# Patient Record
Sex: MALE | Race: WHITE | URBAN - METROPOLITAN AREA
[De-identification: names, ages, dates, MRNs, and addresses within clinical notes are randomized per-mention and may not be internally consistent; named-entity substitution may affect disease eponyms.]

---

## 2017-02-02 ENCOUNTER — OP HISTORICAL/CONVERTED ENCOUNTER (OUTPATIENT)
Dept: OTHER | Age: 82
End: 2017-02-02

## 2017-02-11 ENCOUNTER — OP HISTORICAL/CONVERTED ENCOUNTER (OUTPATIENT)
Dept: OTHER | Age: 82
End: 2017-02-11

## 2017-03-01 ENCOUNTER — OP HISTORICAL/CONVERTED ENCOUNTER (OUTPATIENT)
Dept: OTHER | Age: 82
End: 2017-03-01

## 2017-03-15 ENCOUNTER — OP HISTORICAL/CONVERTED ENCOUNTER (OUTPATIENT)
Dept: OTHER | Age: 82
End: 2017-03-15

## 2017-03-17 ENCOUNTER — OP HISTORICAL/CONVERTED ENCOUNTER (OUTPATIENT)
Dept: OTHER | Age: 82
End: 2017-03-17

## 2017-04-07 ENCOUNTER — OP HISTORICAL/CONVERTED ENCOUNTER (OUTPATIENT)
Dept: OTHER | Age: 82
End: 2017-04-07

## 2017-04-25 ENCOUNTER — ED HISTORICAL/CONVERTED ENCOUNTER (OUTPATIENT)
Dept: OTHER | Age: 82
End: 2017-04-25

## 2017-05-10 ENCOUNTER — IP HISTORICAL/CONVERTED ENCOUNTER (OUTPATIENT)
Dept: OTHER | Age: 82
End: 2017-05-10

## 2017-05-10 ENCOUNTER — OP HISTORICAL/CONVERTED ENCOUNTER (OUTPATIENT)
Dept: OTHER | Age: 82
End: 2017-05-10

## 2017-06-01 ENCOUNTER — OP HISTORICAL/CONVERTED ENCOUNTER (OUTPATIENT)
Dept: OTHER | Age: 82
End: 2017-06-01

## 2017-07-03 ENCOUNTER — OP HISTORICAL/CONVERTED ENCOUNTER (OUTPATIENT)
Dept: OTHER | Age: 82
End: 2017-07-03

## 2018-08-02 ENCOUNTER — OP HISTORICAL/CONVERTED ENCOUNTER (OUTPATIENT)
Dept: OTHER | Age: 83
End: 2018-08-02

## 2021-08-04 ENCOUNTER — OFFICE VISIT (OUTPATIENT)
Dept: ENT CLINIC | Age: 86
End: 2021-08-04
Payer: MEDICARE

## 2021-08-04 VITALS
SYSTOLIC BLOOD PRESSURE: 120 MMHG | HEART RATE: 54 BPM | TEMPERATURE: 97 F | WEIGHT: 190 LBS | OXYGEN SATURATION: 99 % | HEIGHT: 66 IN | DIASTOLIC BLOOD PRESSURE: 70 MMHG | BODY MASS INDEX: 30.53 KG/M2 | RESPIRATION RATE: 16 BRPM

## 2021-08-04 DIAGNOSIS — H90.3 SENSORINEURAL HEARING LOSS (SNHL) OF BOTH EARS: ICD-10-CM

## 2021-08-04 DIAGNOSIS — R07.0 THROAT PAIN: Primary | ICD-10-CM

## 2021-08-04 DIAGNOSIS — R13.14 PHARYNGOESOPHAGEAL DYSPHAGIA: ICD-10-CM

## 2021-08-04 DIAGNOSIS — Z85.89 HISTORY OF MALIGNANT NEOPLASM OF HEAD AND NECK: ICD-10-CM

## 2021-08-04 DIAGNOSIS — R29.898 RIGHT ARM WEAKNESS: ICD-10-CM

## 2021-08-04 PROCEDURE — G8432 DEP SCR NOT DOC, RNG: HCPCS | Performed by: OTOLARYNGOLOGY

## 2021-08-04 PROCEDURE — G8427 DOCREV CUR MEDS BY ELIG CLIN: HCPCS | Performed by: OTOLARYNGOLOGY

## 2021-08-04 PROCEDURE — G8536 NO DOC ELDER MAL SCRN: HCPCS | Performed by: OTOLARYNGOLOGY

## 2021-08-04 PROCEDURE — 1101F PT FALLS ASSESS-DOCD LE1/YR: CPT | Performed by: OTOLARYNGOLOGY

## 2021-08-04 PROCEDURE — 31575 DIAGNOSTIC LARYNGOSCOPY: CPT | Performed by: OTOLARYNGOLOGY

## 2021-08-04 PROCEDURE — 99214 OFFICE O/P EST MOD 30 MIN: CPT | Performed by: OTOLARYNGOLOGY

## 2021-08-04 PROCEDURE — G8417 CALC BMI ABV UP PARAM F/U: HCPCS | Performed by: OTOLARYNGOLOGY

## 2021-08-04 RX ORDER — TERAZOSIN 10 MG/1
CAPSULE ORAL
COMMUNITY
Start: 2021-06-02

## 2021-08-04 RX ORDER — FLUOXETINE HYDROCHLORIDE 20 MG/1
CAPSULE ORAL
COMMUNITY
Start: 2021-06-10

## 2021-08-04 RX ORDER — FINASTERIDE 5 MG/1
TABLET, FILM COATED ORAL
COMMUNITY
Start: 2021-06-28

## 2021-08-04 RX ORDER — RIVAROXABAN 15 MG/1
TABLET, FILM COATED ORAL
COMMUNITY
Start: 2021-07-14

## 2021-08-04 RX ORDER — LEVOTHYROXINE SODIUM 50 UG/1
TABLET ORAL
COMMUNITY
Start: 2021-05-28

## 2021-08-04 NOTE — PROGRESS NOTES
Subjective:    Franko Current   81 y.o.   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 He reports having a sore throat was worse last week thought it might have been due to mowing grass outside. Also dry throat in the morning. Also occasionally feeling choked up on his liquids. Modifying Features -massage helps with the right neck pains    Associated symptoms/signs -hearing loss, right shoulder weakness      Review of Systems  Review of Systems   Constitutional: Negative for chills and fever. HENT: Positive for hearing loss and sore throat. Negative for ear pain, nosebleeds and tinnitus. Eyes: Negative for blurred vision and double vision. Respiratory: Negative for cough, sputum production and shortness of breath. Cardiovascular: Negative for chest pain and palpitations. Gastrointestinal: Negative for heartburn, nausea and vomiting. Genitourinary: Positive for dysuria. Musculoskeletal: Positive for joint pain, myalgias and neck pain. Skin: Negative. Neurological: Negative for dizziness, speech change, weakness and headaches. Endo/Heme/Allergies: Negative for environmental allergies. Does not bruise/bleed easily. Psychiatric/Behavioral: Negative for memory loss. The patient does not have insomnia. Past Medical History:   Diagnosis Date    Thyroid disease      Prior to Admission medications    Medication Sig Start Date End Date Taking?  Authorizing Provider   FLUoxetine (PROzac) 20 mg capsule TAKE ONE CAPSULE BY MOUTH EVERY DAY 6/10/21  Yes Provider, Historical   levothyroxine (SYNTHROID) 50 mcg tablet TAKE ONE TABLET BY MOUTH EVERY MORNING ON AN EMPTY STOMACH 5/28/21  Yes Provider, Historical   Xarelto 15 mg tab tablet TAKE ONE TABLET BY MOUTH EVERY DAY WITH evening meal 7/14/21  Yes Provider, Historical   terazosin (HYTRIN) 10 mg capsule TAKE ONE CAPSULE BY MOUTH EVERY DAY 6/2/21  Yes Provider, Historical   finasteride (PROSCAR) 5 mg tablet TAKE ONE TABLET BY MOUTH EVERY DAY 6/28/21  Yes Provider, Historical            Objective:     Visit Vitals  /70 (BP 1 Location: Left upper arm, BP Patient Position: Sitting, BP Cuff Size: Adult)   Pulse (!) 54   Temp 97 °F (36.1 °C) (Temporal)   Resp 16   Ht 5' 6\" (1.676 m)   Wt 190 lb (86.2 kg)   SpO2 99%   BMI 30.67 kg/m²        Physical Exam  Vitals reviewed. Constitutional:       General: He is awake. Appearance: Normal appearance. He is normal weight. HENT:      Head: Normocephalic and atraumatic. Jaw: There is normal jaw occlusion. No trismus, tenderness or malocclusion. Salivary Glands: Right salivary gland is not diffusely enlarged or tender. Left salivary gland is not diffusely enlarged or tender. Comments: Scattered crusted skin lesions throughout the face and head     Right Ear: Tympanic membrane, ear canal and external ear normal. Decreased hearing noted. Left Ear: Tympanic membrane, ear canal and external ear normal. Decreased hearing noted. Ears:      Carpenter exam findings: does not lateralize. Right Rinne: AC > BC. Left Rinne: AC > BC. Nose: No septal deviation, mucosal edema or rhinorrhea. Right Turbinates: Not enlarged, swollen or pale. Left Turbinates: Not enlarged, swollen or pale. Right Sinus: No maxillary sinus tenderness or frontal sinus tenderness. Left Sinus: No maxillary sinus tenderness or frontal sinus tenderness. Mouth/Throat:      Lips: Pink. Mouth: Mucous membranes are moist. No oral lesions. Dentition: Abnormal dentition (Edentulous). No gum lesions. Tongue: No lesions.       Palate: No mass and lesions. Pharynx: Oropharynx is clear. Uvula midline. Tonsils: No tonsillar exudate. 0 on the right. 0 on the left. Eyes:      General: Vision grossly intact. Extraocular Movements: Extraocular movements intact. Right eye: No nystagmus. Left eye: No nystagmus. Pupils: Pupils are equal, round, and reactive to light. Neck:      Thyroid: No thyroid mass, thyromegaly or thyroid tenderness. Trachea: Trachea and phonation normal. No tracheal tenderness. Comments: Postsurgical changes right neck. Fibrosis of the sternocleidomastoid muscle. No mass or adenopathy  Cardiovascular:      Rate and Rhythm: Normal rate and regular rhythm. Pulmonary:      Effort: Pulmonary effort is normal.      Breath sounds: Normal breath sounds. No stridor. No wheezing. Musculoskeletal:         General: Normal range of motion. Right shoulder: Decreased strength. Cervical back: Normal range of motion. No edema or erythema. Lymphadenopathy:      Cervical: No cervical adenopathy. Skin:     General: Skin is warm and dry. Neurological:      General: No focal deficit present. Mental Status: He is alert and oriented to person, place, and time. Mental status is at baseline. Cranial Nerves: Cranial nerves are intact. Coordination: Romberg sign negative. Gait: Gait is intact. Psychiatric:         Mood and Affect: Mood normal.         Behavior: Behavior normal. Behavior is cooperative. Procedure Note - Fiberoptic Laryngoscopy    Verbal consent is obtained. The nares are sprayed with topical lidocaine/oxymetazoline solution. After several minutes the fiberoptic scope is advanced into one or both nasal passages. Findings are as summarized.     Nose - normal turbinates, septum; mucosal edema  Nasopharynx - normal eustachian tubes, no mucosal lesions  Oropharynx - normal tonsils, tongue base and posterior wall  Hypopharynx - normal pyriform sinus and post-cricoid region  Larynx - normal epiglottis, arytenoids, false cords, and true cords  Subglottis - visualized upper trachea is normal      Assessment/Plan:     Encounter Diagnoses   Name Primary?  Throat pain Yes    Pharyngoesophageal dysphagia     Right arm weakness     History of malignant neoplasm of head and neck     Sensorineural hearing loss (SNHL) of both ears      Throat pain/dysphagia -scope exam is clear today. Likely irritation from his exposure to grass, he also has dry mouth in the morning from mouth breathing. He can continue with lozenges and salt water gargles. His dysphagia is likely age-related and he will continue with small bites of food and small sips of water. 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 We discussed restarting physical therapy but he wants to continue to just do exercises at home. I do suggest he follow back up with dermatology to address the many facial skin lesions. Recommend follow-up in 6 months      Orders Placed This Encounter    LARYNGOSCOPY,FLEX FIBER,DIAGNOSTIC    FLUoxetine (PROzac) 20 mg capsule    levothyroxine (SYNTHROID) 50 mcg tablet    Xarelto 15 mg tab tablet    terazosin (HYTRIN) 10 mg capsule    finasteride (PROSCAR) 5 mg tablet     Follow-up and Dispositions    · Return in about 6 months (around 2/4/2022). Connor Livingston MD, [address] [facility]  [address] [place] [address] [address]  [facility] 14. Orikathleen Schreiber 7860

## 2021-08-04 NOTE — LETTER
8/4/2021    Patient: Dio Holcomb   YOB: 1935   Date of Visit: 8/4/2021     Emelyn Palmer MD  [address] [address]  Via Fax: [phone]    Dear Emelyn Palmer MD,      Thank you for referring Mr. Emerson Bales to [facility] [address] for evaluation. My notes for this consultation are attached. If you have questions, please do not hesitate to call me. I look forward to following your patient along with you.       Sincerely,    Radha Oropeza MD

## 2022-02-02 ENCOUNTER — OFFICE VISIT (OUTPATIENT)
Dept: ENT CLINIC | Age: 87
End: 2022-02-02
Payer: MEDICARE

## 2022-02-02 VITALS
HEART RATE: 63 BPM | TEMPERATURE: 96.9 F | SYSTOLIC BLOOD PRESSURE: 118 MMHG | RESPIRATION RATE: 16 BRPM | DIASTOLIC BLOOD PRESSURE: 72 MMHG | HEIGHT: 66 IN | OXYGEN SATURATION: 100 % | WEIGHT: 194 LBS | BODY MASS INDEX: 31.18 KG/M2

## 2022-02-02 DIAGNOSIS — Z85.89 HISTORY OF MALIGNANT NEOPLASM OF HEAD AND NECK: ICD-10-CM

## 2022-02-02 DIAGNOSIS — R26.89 BALANCE DISORDER: ICD-10-CM

## 2022-02-02 DIAGNOSIS — R13.14 PHARYNGOESOPHAGEAL DYSPHAGIA: Primary | ICD-10-CM

## 2022-02-02 DIAGNOSIS — H90.3 SENSORINEURAL HEARING LOSS (SNHL) OF BOTH EARS: ICD-10-CM

## 2022-02-02 PROCEDURE — G8417 CALC BMI ABV UP PARAM F/U: HCPCS | Performed by: OTOLARYNGOLOGY

## 2022-02-02 PROCEDURE — 99214 OFFICE O/P EST MOD 30 MIN: CPT | Performed by: OTOLARYNGOLOGY

## 2022-02-02 PROCEDURE — 1101F PT FALLS ASSESS-DOCD LE1/YR: CPT | Performed by: OTOLARYNGOLOGY

## 2022-02-02 PROCEDURE — G8432 DEP SCR NOT DOC, RNG: HCPCS | Performed by: OTOLARYNGOLOGY

## 2022-02-02 PROCEDURE — G8427 DOCREV CUR MEDS BY ELIG CLIN: HCPCS | Performed by: OTOLARYNGOLOGY

## 2022-02-02 PROCEDURE — G8536 NO DOC ELDER MAL SCRN: HCPCS | Performed by: OTOLARYNGOLOGY

## 2022-02-02 NOTE — PROGRESS NOTES
Visit Vitals  /72 (BP 1 Location: Left upper arm, BP Patient Position: Sitting, BP Cuff Size: Large adult)   Pulse 63   Temp 96.9 °F (36.1 °C) (Temporal)   Resp 16   Ht 5' 6\" (1.676 m)   Wt 194 lb (88 kg)   SpO2 100%   BMI 31.31 kg/m²     Chief Complaint   Patient presents with    Follow-up     Recheck Pharyngoesphogeal Dysphasia

## 2022-02-02 NOTE — PROGRESS NOTES
Subjective:    Lev Branch   81 y.o.   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 He reports having a sore throat was worse last week thought it might have been due to mowing grass outside. Also dry throat in the morning. Also occasionally feeling choked up on his liquids. Modifying Features -massage helps with the right neck pains    Associated symptoms/signs -hearing loss, right shoulder weakness    2/2/22 - 6 mos f/u overall doing well no changes in last 6 mos - he has seen Dr Rafi Mitchell for some skin lesions but he does not want to go to [place] for Mohs anymore    Review of Systems  Review of Systems   Constitutional: Negative for chills and fever. HENT: Positive for hearing loss and sore throat. Negative for ear pain, nosebleeds and tinnitus. Eyes: Negative for blurred vision and double vision. Respiratory: Negative for cough, sputum production and shortness of breath. Cardiovascular: Negative for chest pain and palpitations. Gastrointestinal: Negative for heartburn, nausea and vomiting. Genitourinary: Positive for dysuria. Musculoskeletal: Positive for joint pain, myalgias and neck pain. Skin: Negative. Neurological: Negative for dizziness, speech change, weakness and headaches. Endo/Heme/Allergies: Negative for environmental allergies. Does not bruise/bleed easily. Psychiatric/Behavioral: Negative for memory loss. The patient does not have insomnia. Past Medical History:   Diagnosis Date    Thyroid disease      Prior to Admission medications    Medication Sig Start Date End Date Taking?  Authorizing Provider   FLUoxetine (PROzac) 20 mg capsule TAKE ONE CAPSULE BY MOUTH EVERY DAY 6/10/21  Yes Provider, Historical   levothyroxine (SYNTHROID) 50 mcg tablet TAKE ONE TABLET BY MOUTH EVERY MORNING ON AN EMPTY STOMACH 5/28/21  Yes Provider, Historical   Xarelto 15 mg tab tablet TAKE ONE TABLET BY MOUTH EVERY DAY WITH evening meal 7/14/21  Yes Provider, Historical   terazosin (HYTRIN) 10 mg capsule TAKE ONE CAPSULE BY MOUTH EVERY DAY 6/2/21  Yes Provider, Historical   finasteride (PROSCAR) 5 mg tablet TAKE ONE TABLET BY MOUTH EVERY DAY 6/28/21  Yes Provider, Historical            Objective:     Visit Vitals  /72 (BP 1 Location: Left upper arm, BP Patient Position: Sitting, BP Cuff Size: Large adult)   Pulse 63   Temp 96.9 °F (36.1 °C) (Temporal)   Resp 16   Ht 5' 6\" (1.676 m)   Wt 194 lb (88 kg)   SpO2 100%   BMI 31.31 kg/m²        Physical Exam  Vitals reviewed. Constitutional:       General: He is awake. Appearance: Normal appearance. He is normal weight. HENT:      Head: Normocephalic and atraumatic. Jaw: There is normal jaw occlusion. No trismus, tenderness or malocclusion. Salivary Glands: Right salivary gland is not diffusely enlarged or tender. Left salivary gland is not diffusely enlarged or tender. Comments: Scattered crusted skin lesions throughout the face and head     Right Ear: Tympanic membrane, ear canal and external ear normal. Decreased hearing noted. Left Ear: Tympanic membrane, ear canal and external ear normal. Decreased hearing noted. Ears:      Carpenter exam findings: does not lateralize. Right Rinne: AC > BC. Left Rinne: AC > BC. Nose: No septal deviation, mucosal edema or rhinorrhea. Right Turbinates: Not enlarged, swollen or pale. Left Turbinates: Not enlarged, swollen or pale. Right Sinus: No maxillary sinus tenderness or frontal sinus tenderness. Left Sinus: No maxillary sinus tenderness or frontal sinus tenderness. Mouth/Throat:      Lips: Pink. Mouth: Mucous membranes are moist. No oral lesions. Dentition: Abnormal dentition (Edentulous). No gum lesions. Tongue: No lesions. Palate: No mass and lesions. Pharynx: Oropharynx is clear. Uvula midline. Tonsils: No tonsillar exudate. 0 on the right. 0 on the left. Eyes:      General: Vision grossly intact. Extraocular Movements: Extraocular movements intact. Right eye: No nystagmus. Left eye: No nystagmus. Pupils: Pupils are equal, round, and reactive to light. Neck:      Thyroid: No thyroid mass, thyromegaly or thyroid tenderness. Trachea: Trachea and phonation normal. No tracheal tenderness. Comments: Postsurgical changes right neck. Fibrosis of the sternocleidomastoid muscle. No mass or adenopathy  Cardiovascular:      Rate and Rhythm: Normal rate and regular rhythm. Pulmonary:      Effort: Pulmonary effort is normal.      Breath sounds: Normal breath sounds. No stridor. No wheezing. Musculoskeletal:         General: Normal range of motion. Right shoulder: Decreased strength. Cervical back: Normal range of motion. No edema or erythema. Lymphadenopathy:      Cervical: No cervical adenopathy. Skin:     General: Skin is warm and dry. Neurological:      General: No focal deficit present. Mental Status: He is alert and oriented to person, place, and time. Mental status is at baseline. Cranial Nerves: Cranial nerves are intact. Coordination: Romberg sign negative. Gait: Gait is intact. Psychiatric:         Mood and Affect: Mood normal.         Behavior: Behavior normal. Behavior is cooperative. Assessment/Plan:     Encounter Diagnoses   Name Primary?  Pharyngoesophageal dysphagia Yes    History of malignant neoplasm of head and neck     Sensorineural hearing loss (SNHL) of both ears     Balance disorder      Throat pain/dysphagia - stable.  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 We discussed restarting physical therapy but he wants to continue to just do exercises at home. I do suggest he follow back up with dermatology to address the many facial skin lesions. I can excise any skin CA if necessary    Recommend follow-up in 6 months      No orders of the defined types were placed in this encounter. Connor Simmons MD, [address] [facility]  [address] [place] [address] [address]  [facility] 14. [phone]

## 2022-03-18 PROBLEM — H90.3 SENSORINEURAL HEARING LOSS (SNHL) OF BOTH EARS: Status: ACTIVE | Noted: 2021-08-04

## 2022-03-18 PROBLEM — R29.898 RIGHT ARM WEAKNESS: Status: ACTIVE | Noted: 2021-08-04

## 2022-03-19 PROBLEM — R07.0 THROAT PAIN: Status: ACTIVE | Noted: 2021-08-04

## 2022-03-19 PROBLEM — Z85.89 HISTORY OF MALIGNANT NEOPLASM OF HEAD AND NECK: Status: ACTIVE | Noted: 2021-08-04

## 2022-03-20 PROBLEM — R13.14 PHARYNGOESOPHAGEAL DYSPHAGIA: Status: ACTIVE | Noted: 2021-08-04

## 2022-08-02 ENCOUNTER — TELEPHONE (OUTPATIENT)
Dept: ENT CLINIC | Age: 87
End: 2022-08-02

## 2022-08-02 NOTE — TELEPHONE ENCOUNTER
Tried calling Kathy Raygoza  to confirm next appointment, Left voicemail for patient to return call.

## 2022-08-03 ENCOUNTER — OFFICE VISIT (OUTPATIENT)
Dept: ENT CLINIC | Age: 87
End: 2022-08-03
Payer: MEDICARE

## 2022-08-03 VITALS
DIASTOLIC BLOOD PRESSURE: 68 MMHG | BODY MASS INDEX: 31.18 KG/M2 | HEIGHT: 66 IN | WEIGHT: 194 LBS | OXYGEN SATURATION: 100 % | RESPIRATION RATE: 16 BRPM | HEART RATE: 64 BPM | SYSTOLIC BLOOD PRESSURE: 120 MMHG

## 2022-08-03 DIAGNOSIS — C44.300 MALIGNANT NEOPLASM SKIN OF FACE: ICD-10-CM

## 2022-08-03 DIAGNOSIS — H90.3 SENSORINEURAL HEARING LOSS (SNHL) OF BOTH EARS: ICD-10-CM

## 2022-08-03 DIAGNOSIS — C44.202 MALIGNANT NEOPLASM OF SKIN OF RIGHT EAR: ICD-10-CM

## 2022-08-03 DIAGNOSIS — Z85.89 HISTORY OF MALIGNANT NEOPLASM OF HEAD AND NECK: Primary | ICD-10-CM

## 2022-08-03 PROCEDURE — 1101F PT FALLS ASSESS-DOCD LE1/YR: CPT | Performed by: OTOLARYNGOLOGY

## 2022-08-03 PROCEDURE — G8417 CALC BMI ABV UP PARAM F/U: HCPCS | Performed by: OTOLARYNGOLOGY

## 2022-08-03 PROCEDURE — G8427 DOCREV CUR MEDS BY ELIG CLIN: HCPCS | Performed by: OTOLARYNGOLOGY

## 2022-08-03 PROCEDURE — G8536 NO DOC ELDER MAL SCRN: HCPCS | Performed by: OTOLARYNGOLOGY

## 2022-08-03 PROCEDURE — G8510 SCR DEP NEG, NO PLAN REQD: HCPCS | Performed by: OTOLARYNGOLOGY

## 2022-08-03 PROCEDURE — 99214 OFFICE O/P EST MOD 30 MIN: CPT | Performed by: OTOLARYNGOLOGY

## 2022-08-03 PROCEDURE — 1123F ACP DISCUSS/DSCN MKR DOCD: CPT | Performed by: OTOLARYNGOLOGY

## 2022-08-03 NOTE — PROGRESS NOTES
Subjective:    Katheryn Pickens   81 y.o.   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 He reports having a sore throat was worse last week thought it might have been due to mowing grass outside. Also dry throat in the morning. Also occasionally feeling choked up on his liquids. Modifying Features -massage helps with the right neck pains    Associated symptoms/signs -hearing loss, right shoulder weakness    2/2/22 - 6 mos f/u overall doing well no changes in last 6 mos - he has seen Dr Zoraida Linn for some skin lesions but he does not want to go to [place] for Mohs anymore    8/3/22 - 6 mo fu - pt had transferred care to [facility], [place], operated by [facility] dermatology - he has had some new lesions on the face that needed to be addressed    Review of Systems  Review of Systems   Constitutional:  Negative for chills and fever. HENT:  Positive for hearing loss and sore throat. Negative for ear pain, nosebleeds and tinnitus. Eyes:  Negative for blurred vision and double vision. Respiratory:  Negative for cough, sputum production and shortness of breath. Cardiovascular:  Negative for chest pain and palpitations. Gastrointestinal:  Negative for heartburn, nausea and vomiting. Genitourinary:  Positive for dysuria. Musculoskeletal:  Positive for joint pain, myalgias and neck pain. Skin: Negative. Neurological:  Negative for dizziness, speech change, weakness and headaches. Endo/Heme/Allergies:  Negative for environmental allergies. Does not bruise/bleed easily. Psychiatric/Behavioral:  Negative for memory loss. The patient does not have insomnia.         Past Medical History:   Diagnosis Date Thyroid disease      Prior to Admission medications    Medication Sig Start Date End Date Taking? Authorizing Provider   FLUoxetine (PROzac) 20 mg capsule TAKE ONE CAPSULE BY MOUTH EVERY DAY 6/10/21  Yes Provider, Historical   levothyroxine (SYNTHROID) 50 mcg tablet TAKE ONE TABLET BY MOUTH EVERY MORNING ON AN EMPTY STOMACH 5/28/21  Yes Provider, Historical   Xarelto 15 mg tab tablet TAKE ONE TABLET BY MOUTH EVERY DAY WITH evening meal 7/14/21  Yes Provider, Historical   terazosin (HYTRIN) 10 mg capsule TAKE ONE CAPSULE BY MOUTH EVERY DAY 6/2/21  Yes Provider, Historical   finasteride (PROSCAR) 5 mg tablet TAKE ONE TABLET BY MOUTH EVERY DAY 6/28/21  Yes Provider, Historical            Objective:     Visit Vitals  /68 (BP 1 Location: Left upper arm, BP Patient Position: Sitting, BP Cuff Size: Large adult)   Pulse 64   Resp 16   Ht 5' 6\" (1.676 m)   Wt 194 lb (88 kg)   SpO2 100%   BMI 31.31 kg/m²        Physical Exam  Vitals reviewed. Constitutional:       General: He is awake. Appearance: Normal appearance. He is normal weight. HENT:      Head: Normocephalic and atraumatic. Jaw: There is normal jaw occlusion. No trismus, tenderness or malocclusion. Salivary Glands: Right salivary gland is not diffusely enlarged or tender. Left salivary gland is not diffusely enlarged or tender. Comments: Scattered crusted skin lesions throughout the face and head     Right Ear: Tympanic membrane, ear canal and external ear normal. Decreased hearing noted. Left Ear: Tympanic membrane, ear canal and external ear normal. Decreased hearing noted. Nose: No septal deviation, mucosal edema or rhinorrhea. Right Turbinates: Not enlarged, swollen or pale. Left Turbinates: Not enlarged, swollen or pale. Right Sinus: No maxillary sinus tenderness or frontal sinus tenderness. Left Sinus: No maxillary sinus tenderness or frontal sinus tenderness. Mouth/Throat:      Lips: Pink. Mouth: Mucous membranes are moist. No oral lesions. Dentition: Abnormal dentition (Edentulous). No gum lesions. Tongue: No lesions. Palate: No mass and lesions. Pharynx: Oropharynx is clear. Uvula midline. Tonsils: No tonsillar exudate. 0 on the right. 0 on the left. Eyes:      General: Vision grossly intact. Extraocular Movements: Extraocular movements intact. Right eye: No nystagmus. Left eye: No nystagmus. Pupils: Pupils are equal, round, and reactive to light. Neck:      Thyroid: No thyroid mass, thyromegaly or thyroid tenderness. Trachea: Trachea and phonation normal. No tracheal tenderness. Comments: Postsurgical changes right neck. Fibrosis of the sternocleidomastoid muscle. No mass or adenopathy  Cardiovascular:      Rate and Rhythm: Normal rate and regular rhythm. Pulmonary:      Effort: Pulmonary effort is normal.      Breath sounds: Normal breath sounds. No stridor. No wheezing. Musculoskeletal:         General: Normal range of motion. Right shoulder: Decreased strength. Cervical back: Normal range of motion. No edema or erythema. Lymphadenopathy:      Cervical: No cervical adenopathy. Skin:     General: Skin is warm and dry. Neurological:      General: No focal deficit present. Mental Status: He is alert and oriented to person, place, and time. Mental status is at baseline. Cranial Nerves: Cranial nerves are intact. Coordination: Romberg sign negative. Gait: Gait is intact. Psychiatric:         Mood and Affect: Mood normal.         Behavior: Behavior normal. Behavior is cooperative. Assessment/Plan:     Encounter Diagnoses   Name Primary? 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 We discussed restarting physical therapy but he wants to continue to just do exercises at home. 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 This should be excised. Will arrange for office visit for this. We can do Oct 4th. No orders of the defined types were placed in this encounter. Connor Figueroa MD, [address] [facility]  [address] [place] [address] [address]  0 [facility] 11. [phone]

## 2022-08-03 NOTE — LETTER
8/3/2022    Patient: Kathy Raygoza   YOB: 1935   Date of Visit: 8/3/2022     Marlena Severs, MD  [address] [address]  Via Fax: [phone]    Dear Marlena Severs, MD,      Thank you for referring Mr. Hortencia Paz to [facility] [address] for evaluation. My notes for this consultation are attached. If you have questions, please do not hesitate to call me. I look forward to following your patient along with you.       Sincerely,    Kirstin Higuera MD

## 2022-08-03 NOTE — PROGRESS NOTES
Visit Vitals  /68 (BP 1 Location: Left upper arm, BP Patient Position: Sitting, BP Cuff Size: Large adult)   Pulse 64   Resp 16   Ht 5' 6\" (1.676 m)   Wt 194 lb (88 kg)   SpO2 100%   BMI 31.31 kg/m²     Chief Complaint   Patient presents with    Follow-up     Dysphagia

## 2022-10-04 ENCOUNTER — OFFICE VISIT (OUTPATIENT)
Dept: ENT CLINIC | Age: 87
End: 2022-10-04
Payer: MEDICARE

## 2022-10-04 ENCOUNTER — HOSPITAL ENCOUNTER (OUTPATIENT)
Dept: LAB | Age: 87
Discharge: HOME | End: 2022-10-04
Payer: MEDICARE

## 2022-10-04 VITALS
SYSTOLIC BLOOD PRESSURE: 120 MMHG | BODY MASS INDEX: 31.18 KG/M2 | HEIGHT: 66 IN | HEART RATE: 60 BPM | WEIGHT: 194 LBS | OXYGEN SATURATION: 99 % | RESPIRATION RATE: 18 BRPM | DIASTOLIC BLOOD PRESSURE: 70 MMHG

## 2022-10-04 DIAGNOSIS — C44.202: ICD-10-CM

## 2022-10-04 DIAGNOSIS — C44.300 MALIGNANT NEOPLASM SKIN OF FACE: Primary | ICD-10-CM

## 2022-10-04 PROCEDURE — 12051 INTMD RPR FACE/MM 2.5 CM/<: CPT | Performed by: OTOLARYNGOLOGY

## 2022-10-04 PROCEDURE — 88305 TISSUE EXAM BY PATHOLOGIST: CPT

## 2022-10-04 PROCEDURE — 11642 EXC F/E/E/N/L MAL+MRG 1.1-2: CPT | Performed by: OTOLARYNGOLOGY

## 2022-10-04 PROCEDURE — 88331 PATH CONSLTJ SURG 1 BLK 1SPC: CPT

## 2022-10-04 NOTE — PROGRESS NOTES
Excision Malignant Neoplasm of Skin with Intermediate Closure  Excision malignant neoplasm left preauricular cheek  Excision malignant neoplasm right superior auricular helix    Informed consent was obtained. The area surrounding the 2 skin lesions was cleansed with alcohol then injected with  4mL of 1% lidocaine with 1:100,000 parts epinephrine. We then prepped the areas with betadine and draped in sterile fashion. A 15 blade was used to incise the skin surrounding the lesions with gross margins. Nodular lesion of the left preauricular skin was taken down to healthy-appearing subcutaneous fat. The helix lesion was taken down to the perichondrium. . Scalpel and curved scissors were used to complete the excisions. The specimen are suture marked for pathologic orientation and sent for frozen section. Hemostasis was achieved with bipolar cautery and a temporary pressure dressing was applied. Frozen section revealed negative margins in the left cheek. Focally positive deep margin for the right helix therefore I excised the entire base of the helix resection which involved full-thickness resection of the helical cartilage. The specimen is sent for permanent section. The excision/defect is 1.3 cm for the ear and 1.5 cm for the left preauricular. We closed the left preauricular defect primarily with a layered closure. The surrounding skin was undermined with scissors, then the wound closed in layers with interrupted 4-0 vicryl deep suture, and a running 5-0 fast absorbing plain gut for the skin. The wound was cleaned and Steri-strips and sterile pressure dressing was applied. The helix excision was closed in a single layer with running 5-0 plain gut suture. Sterile dressing is applied.

## 2023-02-07 ENCOUNTER — TELEPHONE (OUTPATIENT)
Dept: ENT CLINIC | Age: 88
End: 2023-02-07

## 2023-02-08 ENCOUNTER — OFFICE VISIT (OUTPATIENT)
Dept: ENT CLINIC | Age: 88
End: 2023-02-08
Payer: MEDICARE

## 2023-02-08 VITALS
HEART RATE: 60 BPM | BODY MASS INDEX: 31.64 KG/M2 | DIASTOLIC BLOOD PRESSURE: 70 MMHG | WEIGHT: 196 LBS | OXYGEN SATURATION: 98 % | SYSTOLIC BLOOD PRESSURE: 124 MMHG

## 2023-02-08 DIAGNOSIS — C44.300 MALIGNANT NEOPLASM SKIN OF FACE: Primary | ICD-10-CM

## 2023-02-08 DIAGNOSIS — H90.3 SENSORINEURAL HEARING LOSS (SNHL) OF BOTH EARS: ICD-10-CM

## 2023-02-08 DIAGNOSIS — J01.00 ACUTE NON-RECURRENT MAXILLARY SINUSITIS: ICD-10-CM

## 2023-02-08 DIAGNOSIS — T66.XXXS RADIATION INJURY, SEQUELA: ICD-10-CM

## 2023-02-08 DIAGNOSIS — Z85.89 HISTORY OF MALIGNANT NEOPLASM OF HEAD AND NECK: ICD-10-CM

## 2023-02-08 PROCEDURE — 1101F PT FALLS ASSESS-DOCD LE1/YR: CPT | Performed by: OTOLARYNGOLOGY

## 2023-02-08 PROCEDURE — G8417 CALC BMI ABV UP PARAM F/U: HCPCS | Performed by: OTOLARYNGOLOGY

## 2023-02-08 PROCEDURE — G8427 DOCREV CUR MEDS BY ELIG CLIN: HCPCS | Performed by: OTOLARYNGOLOGY

## 2023-02-08 PROCEDURE — G8432 DEP SCR NOT DOC, RNG: HCPCS | Performed by: OTOLARYNGOLOGY

## 2023-02-08 PROCEDURE — G8536 NO DOC ELDER MAL SCRN: HCPCS | Performed by: OTOLARYNGOLOGY

## 2023-02-08 PROCEDURE — 99214 OFFICE O/P EST MOD 30 MIN: CPT | Performed by: OTOLARYNGOLOGY

## 2023-02-08 PROCEDURE — 1123F ACP DISCUSS/DSCN MKR DOCD: CPT | Performed by: OTOLARYNGOLOGY

## 2023-02-08 RX ORDER — AMOXICILLIN 500 MG/1
500 CAPSULE ORAL 3 TIMES DAILY
Qty: 30 CAPSULE | Refills: 1 | Status: SHIPPED | OUTPATIENT
Start: 2023-02-08

## 2023-02-08 NOTE — LETTER
2/8/2023    Patient: Cory Patel   YOB: 1935   Date of Visit: 2/8/2023     Robert Alvarado MD  Via [address] [address]  Via Fax: [phone]    Dear Robert Alvarado MD,      Thank you for referring Mr. Adonis Raman to [facility] [address] for evaluation. My notes for this consultation are attached. If you have questions, please do not hesitate to call me. I look forward to following your patient along with you.       Sincerely,    Adolph Freed MD

## 2023-02-08 NOTE — PROGRESS NOTES
Subjective:    Mohinder Godfrey   81 y.o.   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 He reports having a sore throat was worse last week thought it might have been due to mowing grass outside. Also dry throat in the morning. Also occasionally feeling choked up on his liquids. Modifying Features -massage helps with the right neck pains    Associated symptoms/signs -hearing loss, right shoulder weakness    2/2/22 - 6 mos f/u overall doing well no changes in last 6 mos - he has seen Dr Bo Jurado for some skin lesions but he does not want to go to [place] for Mohs anymore    8/3/22 - 6 mo fu - pt had transferred care to [facility] dermatology - he has had some new lesions on the face that needed to be addressed    2/8/23  6 mos fu pt has been doing well; some nasal congestion/head cold past 3 weeks, mucus is thicker and he needs vicks to breathe; no new skin lesions he is concerned about; had last excisions by me in Oct 2022    Review of Systems  Review of Systems   Constitutional:  Negative for chills and fever. HENT:  Positive for hearing loss and sore throat. Negative for ear pain, nosebleeds and tinnitus. Eyes:  Negative for blurred vision and double vision. Respiratory:  Negative for cough, sputum production and shortness of breath. Cardiovascular:  Negative for chest pain and palpitations. Gastrointestinal:  Negative for heartburn, nausea and vomiting. Genitourinary:  Positive for dysuria. Musculoskeletal:  Positive for joint pain, myalgias and neck pain. Skin: Negative. Neurological:  Negative for dizziness, speech change, weakness and headaches. Endo/Heme/Allergies:  Negative for environmental allergies. Does not bruise/bleed easily. Psychiatric/Behavioral:  Negative for memory loss. The patient does not have insomnia. Past Medical History:   Diagnosis Date    Thyroid disease      Prior to Admission medications    Medication Sig Start Date End Date Taking? Authorizing Provider   amoxicillin (AMOXIL) 500 mg capsule Take 1 Capsule by mouth three (3) times daily. 2/8/23  Yes James Alberto MD   FLUoxetine (PROzac) 20 mg capsule TAKE ONE CAPSULE BY MOUTH EVERY DAY 6/10/21  Yes Provider, Historical   levothyroxine (SYNTHROID) 50 mcg tablet TAKE ONE TABLET BY MOUTH EVERY MORNING ON AN EMPTY STOMACH 5/28/21  Yes Provider, Historical   Xarelto 15 mg tab tablet TAKE ONE TABLET BY MOUTH EVERY DAY WITH evening meal 7/14/21  Yes Provider, Historical   terazosin (HYTRIN) 10 mg capsule TAKE ONE CAPSULE BY MOUTH EVERY DAY 6/2/21  Yes Provider, Historical   finasteride (PROSCAR) 5 mg tablet TAKE ONE TABLET BY MOUTH EVERY DAY 6/28/21  Yes Provider, Historical            Objective:     Visit Vitals  /70 (BP 1 Location: Left arm, BP Patient Position: Sitting, BP Cuff Size: Large adult)   Pulse 60   Wt 196 lb (88.9 kg)   SpO2 98%   BMI 31.64 kg/m²        Physical Exam  Vitals reviewed. Constitutional:       General: He is awake. Appearance: Normal appearance. He is normal weight. HENT:      Head: Normocephalic and atraumatic. Jaw: There is normal jaw occlusion. No trismus, tenderness or malocclusion. Salivary Glands: Right salivary gland is not diffusely enlarged or tender. Left salivary gland is not diffusely enlarged or tender. Comments: Scattered skin changes on face/head, no major lesions     Right Ear: Tympanic membrane, ear canal and external ear normal. Decreased hearing noted. Left Ear: Tympanic membrane, ear canal and external ear normal. Decreased hearing noted. Nose: No septal deviation, mucosal edema or rhinorrhea. Right Turbinates: Not enlarged, swollen or pale. Left Turbinates: Not enlarged, swollen or pale. Right Sinus: No maxillary sinus tenderness or frontal sinus tenderness. Left Sinus: No maxillary sinus tenderness or frontal sinus tenderness. Mouth/Throat:      Lips: Pink. Mouth: Mucous membranes are moist. No oral lesions. Dentition: Abnormal dentition (Edentulous). No gum lesions. Tongue: No lesions. Palate: No mass and lesions. Pharynx: Oropharynx is clear. Uvula midline. Tonsils: No tonsillar exudate. 0 on the right. 0 on the left. Eyes:      General: Vision grossly intact. Extraocular Movements: Extraocular movements intact. Right eye: No nystagmus. Left eye: No nystagmus. Pupils: Pupils are equal, round, and reactive to light. Neck:      Thyroid: No thyroid mass, thyromegaly or thyroid tenderness. Trachea: Trachea and phonation normal. No tracheal tenderness. Comments: Postsurgical changes right neck. Fibrosis of the sternocleidomastoid muscle. No mass or adenopathy  Cardiovascular:      Rate and Rhythm: Normal rate and regular rhythm. Pulmonary:      Effort: Pulmonary effort is normal.      Breath sounds: Normal breath sounds. No stridor. No wheezing. Musculoskeletal:         General: Normal range of motion. Right shoulder: Decreased strength. Cervical back: Normal range of motion. No edema or erythema. Lymphadenopathy:      Cervical: No cervical adenopathy. Skin:     General: Skin is warm and dry. Neurological:      General: No focal deficit present. Mental Status: He is alert and oriented to person, place, and time. Mental status is at baseline. Coordination: Romberg sign negative. Gait: Gait is intact. Psychiatric:         Mood and Affect: Mood normal.         Behavior: Behavior normal. Behavior is cooperative. Assessment/Plan:     Encounter Diagnoses   Name Primary? Malignant neoplasm skin of face Yes    History of malignant neoplasm of head and neck     Sensorineural hearing loss (SNHL) of both ears     Acute non-recurrent maxillary sinusitis     Radiation injury, sequela        Throat pain/dysphagia - stable. 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 Recent excisions left preauricular and right helix are with free margins. Doing well    Will add abx for sinus      Orders Placed This Encounter    amoxicillin (AMOXIL) 500 mg capsule       Follow-up and Dispositions    Return in about 6 months (around 8/8/2023). Connor Mcgregor MD, [address]  [address] [place] [address] [address]  [facility] 14. [phone]

## 2023-05-16 RX ORDER — FINASTERIDE 5 MG/1
1 TABLET, FILM COATED ORAL DAILY
COMMUNITY
Start: 2021-06-28

## 2023-05-16 RX ORDER — AMOXICILLIN 500 MG/1
500 CAPSULE ORAL 3 TIMES DAILY
COMMUNITY
Start: 2023-02-08

## 2023-05-20 RX ORDER — LEVOTHYROXINE SODIUM 0.05 MG/1
TABLET ORAL
COMMUNITY
Start: 2021-05-28

## 2023-05-20 RX ORDER — FLUOXETINE HYDROCHLORIDE 20 MG/1
1 CAPSULE ORAL DAILY
COMMUNITY
Start: 2021-06-10

## 2023-05-20 RX ORDER — TERAZOSIN 10 MG/1
1 CAPSULE ORAL DAILY
COMMUNITY
Start: 2021-06-02

## 2023-08-09 ENCOUNTER — OFFICE VISIT (OUTPATIENT)
Age: 88
End: 2023-08-09
Payer: MEDICARE

## 2023-08-09 VITALS
WEIGHT: 196 LBS | OXYGEN SATURATION: 95 % | HEIGHT: 66 IN | RESPIRATION RATE: 17 BRPM | HEART RATE: 58 BPM | BODY MASS INDEX: 31.5 KG/M2 | DIASTOLIC BLOOD PRESSURE: 80 MMHG | SYSTOLIC BLOOD PRESSURE: 130 MMHG

## 2023-08-09 DIAGNOSIS — R07.0 THROAT PAIN: ICD-10-CM

## 2023-08-09 DIAGNOSIS — H90.3 SENSORINEURAL HEARING LOSS, BILATERAL: Primary | ICD-10-CM

## 2023-08-09 DIAGNOSIS — Z85.818 HISTORY OF MALIGNANT NEOPLASM OF PAROTID GLAND: ICD-10-CM

## 2023-08-09 PROCEDURE — G8427 DOCREV CUR MEDS BY ELIG CLIN: HCPCS | Performed by: OTOLARYNGOLOGY

## 2023-08-09 PROCEDURE — 1123F ACP DISCUSS/DSCN MKR DOCD: CPT | Performed by: OTOLARYNGOLOGY

## 2023-08-09 PROCEDURE — 99214 OFFICE O/P EST MOD 30 MIN: CPT | Performed by: OTOLARYNGOLOGY

## 2023-08-09 PROCEDURE — G8417 CALC BMI ABV UP PARAM F/U: HCPCS | Performed by: OTOLARYNGOLOGY

## 2023-08-09 PROCEDURE — 1036F TOBACCO NON-USER: CPT | Performed by: OTOLARYNGOLOGY

## 2023-08-09 RX ORDER — BUMETANIDE 1 MG/1
TABLET ORAL
COMMUNITY
Start: 2023-07-06

## 2023-08-09 NOTE — PROGRESS NOTES
Positive for joint pain, myalgias and neck pain. Skin: Negative. Neurological:  Negative for dizziness, speech change, weakness and headaches. Endo/Heme/Allergies:  Negative for environmental allergies. Does not bruise/bleed easily. Psychiatric/Behavioral:  Negative for memory loss. The patient does not have insomnia. Past Medical History:   Diagnosis Date    Thyroid disease      Prior to Admission medications    Medication Sig Start Date End Date Taking? Authorizing Provider   amoxicillin (AMOXIL) 500 mg capsule Take 1 Capsule by mouth three (3) times daily. 2/8/23  Yes Jose Suazo MD   FLUoxetine (PROzac) 20 mg capsule TAKE ONE CAPSULE BY MOUTH EVERY DAY 6/10/21  Yes Provider, Historical   levothyroxine (SYNTHROID) 50 mcg tablet TAKE ONE TABLET BY MOUTH EVERY MORNING ON AN EMPTY STOMACH 5/28/21  Yes Provider, Historical   Xarelto 15 mg tab tablet TAKE ONE TABLET BY MOUTH EVERY DAY WITH evening meal 7/14/21  Yes Provider, Historical   terazosin (HYTRIN) 10 mg capsule TAKE ONE CAPSULE BY MOUTH EVERY DAY 6/2/21  Yes Provider, Historical   finasteride (PROSCAR) 5 mg tablet TAKE ONE TABLET BY MOUTH EVERY DAY 6/28/21  Yes Provider, Historical            Objective:     Vitals:    08/09/23 0810   BP: 130/80   Pulse: 58   Resp: 17   SpO2: 95%     Physical Exam  Vitals reviewed. Constitutional:       General: He is awake. Appearance: Normal appearance. He is normal weight. HENT:      Head: Normocephalic and atraumatic. Jaw: There is normal jaw occlusion. No trismus, tenderness or malocclusion. Salivary Glands: Right salivary gland is not diffusely enlarged or tender. Left salivary gland is not diffusely enlarged or tender. Comments: Scattered skin changes on face/head, no major lesions     Right Ear: Tympanic membrane, ear canal and external ear normal. Decreased hearing noted. Left Ear: Tympanic membrane, ear canal and external ear normal. Decreased hearing noted.       Nose:

## 2024-02-27 ENCOUNTER — OFFICE VISIT (OUTPATIENT)
Age: 89
End: 2024-02-27
Payer: MEDICARE

## 2024-02-27 VITALS
HEART RATE: 88 BPM | WEIGHT: 196 LBS | BODY MASS INDEX: 31.5 KG/M2 | HEIGHT: 66 IN | SYSTOLIC BLOOD PRESSURE: 124 MMHG | DIASTOLIC BLOOD PRESSURE: 78 MMHG | OXYGEN SATURATION: 98 % | RESPIRATION RATE: 17 BRPM

## 2024-02-27 DIAGNOSIS — H90.3 SENSORINEURAL HEARING LOSS, BILATERAL: Primary | ICD-10-CM

## 2024-02-27 DIAGNOSIS — Z85.818 HISTORY OF MALIGNANT NEOPLASM OF PAROTID GLAND: ICD-10-CM

## 2024-02-27 DIAGNOSIS — J01.01 ACUTE RECURRENT MAXILLARY SINUSITIS: ICD-10-CM

## 2024-02-27 DIAGNOSIS — C44.211: ICD-10-CM

## 2024-02-27 PROCEDURE — 1036F TOBACCO NON-USER: CPT | Performed by: OTOLARYNGOLOGY

## 2024-02-27 PROCEDURE — 1123F ACP DISCUSS/DSCN MKR DOCD: CPT | Performed by: OTOLARYNGOLOGY

## 2024-02-27 PROCEDURE — G8417 CALC BMI ABV UP PARAM F/U: HCPCS | Performed by: OTOLARYNGOLOGY

## 2024-02-27 PROCEDURE — G8484 FLU IMMUNIZE NO ADMIN: HCPCS | Performed by: OTOLARYNGOLOGY

## 2024-02-27 PROCEDURE — G8427 DOCREV CUR MEDS BY ELIG CLIN: HCPCS | Performed by: OTOLARYNGOLOGY

## 2024-02-27 PROCEDURE — 99214 OFFICE O/P EST MOD 30 MIN: CPT | Performed by: OTOLARYNGOLOGY

## 2024-02-27 RX ORDER — METHYLPREDNISOLONE 4 MG/1
TABLET ORAL
Qty: 1 KIT | Refills: 0 | Status: SHIPPED | OUTPATIENT
Start: 2024-02-27 | End: 2024-03-04

## 2024-02-27 RX ORDER — DOXYCYCLINE HYCLATE 100 MG
100 TABLET ORAL 2 TIMES DAILY
Qty: 10 TABLET | Refills: 0 | Status: SHIPPED | OUTPATIENT
Start: 2024-02-27 | End: 2024-03-08

## 2024-02-27 NOTE — PROGRESS NOTES
Subjective:    Duc Figueroa   87 y.o.   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  He reports having a sore throat was worse last week thought it might have been due to mowing grass outside.  Also dry throat in the morning.  Also occasionally feeling choked up on his liquids.    Modifying Features -massage helps with the right neck pains    Associated symptoms/signs -hearing loss, right shoulder weakness    2/2/22 - 6 mos f/u overall doing well no changes in last 6 mos - he has seen Dr Dunbar for some skin lesions but he does not want to go to [place] for Mohs anymore    8/3/22 - 6 mo fu - pt had transferred care to [facility] - he has had some new lesions on the face that needed to be addressed    2/8/23  6 mos fu pt has been doing well; some nasal congestion/head cold past 3 weeks, mucus is thicker and he needs vicks to breathe; no new skin lesions he is concerned about; had last excisions by me in Oct 2022    8/9/23  6 mos fu - has been doing well.  Some sore throat this morning.  Had some yellow drainage before, now better.  Had OTC hearing aids didn't help    2/27/24  6 mos fu, pt has been having sinus congestion for 2-3 weeks not getting better; also right ear feels stopped up.  Also having a few skin lesions wants to have checked    Review of Systems  Review of Systems   Constitutional:  Negative for chills and fever.   HENT:  Positive for hearing loss and sore throat. Negative for ear pain, nosebleeds and tinnitus.    Eyes:  Negative for blurred vision and double vision.   Respiratory:  Negative for cough, sputum production and shortness of breath.

## 2024-05-28 ENCOUNTER — OFFICE VISIT (OUTPATIENT)
Age: 89
End: 2024-05-28
Payer: MEDICARE

## 2024-05-28 VITALS
HEART RATE: 75 BPM | HEIGHT: 66 IN | RESPIRATION RATE: 17 BRPM | OXYGEN SATURATION: 97 % | DIASTOLIC BLOOD PRESSURE: 80 MMHG | WEIGHT: 196 LBS | BODY MASS INDEX: 31.5 KG/M2 | SYSTOLIC BLOOD PRESSURE: 132 MMHG

## 2024-05-28 DIAGNOSIS — Z85.818 HISTORY OF MALIGNANT NEOPLASM OF PAROTID GLAND: ICD-10-CM

## 2024-05-28 DIAGNOSIS — C44.300 UNSPECIFIED MALIGNANT NEOPLASM OF SKIN OF UNSPECIFIED PART OF FACE: ICD-10-CM

## 2024-05-28 DIAGNOSIS — H90.3 SENSORINEURAL HEARING LOSS, BILATERAL: Primary | ICD-10-CM

## 2024-05-28 DIAGNOSIS — C44.211: ICD-10-CM

## 2024-05-28 PROCEDURE — 99214 OFFICE O/P EST MOD 30 MIN: CPT | Performed by: OTOLARYNGOLOGY

## 2024-05-28 PROCEDURE — 1123F ACP DISCUSS/DSCN MKR DOCD: CPT | Performed by: OTOLARYNGOLOGY

## 2024-05-28 PROCEDURE — G8427 DOCREV CUR MEDS BY ELIG CLIN: HCPCS | Performed by: OTOLARYNGOLOGY

## 2024-05-28 PROCEDURE — G8417 CALC BMI ABV UP PARAM F/U: HCPCS | Performed by: OTOLARYNGOLOGY

## 2024-05-28 PROCEDURE — 1036F TOBACCO NON-USER: CPT | Performed by: OTOLARYNGOLOGY

## 2024-05-28 NOTE — PROGRESS NOTES
Subjective:    Duc Figueroa   87 y.o.   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  He reports having a sore throat was worse last week thought it might have been due to mowing grass outside.  Also dry throat in the morning.  Also occasionally feeling choked up on his liquids.    Modifying Features -massage helps with the right neck pains    Associated symptoms/signs -hearing loss, right shoulder weakness    2/2/22 - 6 mos f/u overall doing well no changes in last 6 mos - he has seen Dr Dunbar for some skin lesions but he does not want to go to [place] for Mohs anymore    8/3/22 - 6 mo fu - pt had transferred care to [facility] dermatology - he has had some new lesions on the face that needed to be addressed    2/8/23  6 mos fu pt has been doing well; some nasal congestion/head cold past 3 weeks, mucus is thicker and he needs vicks to breathe; no new skin lesions he is concerned about; had last excisions by me in Oct 2022    8/9/23  6 mos fu - has been doing well.  Some sore throat this morning.  Had some yellow drainage before, now better.  Had OTC hearing aids didn't help    2/27/24  6 mos fu, pt has been having sinus congestion for 2-3 weeks not getting better; also right ear feels stopped up.  Also having a few skin lesions wants to have checked    5/28/24  3 mos fu - he did get better after prednisone, abx, and also PCP office gave him allergy meds and he still has some mucus he spits up.  He did not see the dermatologist in the interim.  Still has same skin lesions.    Review of Systems  Review of Systems   Constitutional:  Negative for chills and fever.   HENT:  Positive for

## 2024-08-16 ENCOUNTER — PROCEDURE VISIT (OUTPATIENT)
Age: 89
End: 2024-08-16

## 2024-08-16 VITALS
HEART RATE: 71 BPM | HEIGHT: 66 IN | SYSTOLIC BLOOD PRESSURE: 122 MMHG | WEIGHT: 196 LBS | BODY MASS INDEX: 31.5 KG/M2 | DIASTOLIC BLOOD PRESSURE: 78 MMHG | RESPIRATION RATE: 18 BRPM | OXYGEN SATURATION: 98 %

## 2024-08-16 DIAGNOSIS — C44.311: ICD-10-CM

## 2024-08-16 DIAGNOSIS — C44.212 BASAL CELL CARCINOMA (BCC) OF AURICLE OF RIGHT EAR: Primary | ICD-10-CM

## 2024-08-16 DIAGNOSIS — C44.229 SQUAMOUS CELL CARCINOMA OF EAR, LEFT: ICD-10-CM

## 2024-08-16 NOTE — PROGRESS NOTES
Presents today for excision of skin cancers.    Excision Malignant Neoplasm of Skin with Intermediate Closure    Excision of left helix squamous cell carcinoma with simple closure 1.2 cm  Excision of right helix basal cell carcinoma with simple closure 1.2 cm  Excision of right nasal ala basal cell carcinoma 7 mm with simple closure    Informed consent was obtained. The area surrounding the 3 skin lesions was cleansed with alcohol then injected with a total of 30 mL of 1% lidocaine with 1:100,000 parts epinephrine. We then prepped the areas with betadine and draped in sterile fashion.  The 2 upper auricle lesions are excised sharply using scalpel down to the cartilage.  The right nasal ala lesion is excised sharply using a 6 mm cervical punch and scalpel.  Hemostasis is achieved with bipolar cautery.    The size of the defects are as mentioned above.    All defects were closed with primary closure.  The surrounding skin was undermined with scissors, then the wound closed in a single layer with interrupted 4-0 chromic for the nose, running 4-0 chromic for the right ear and a running 5-0 fast-absorbing plain gut for the left ear.  The wounds are cleaned and sterile pressure dressing was applied.

## 2024-08-21 LAB
CPT BILLING CODE: ABNORMAL
DIAGNOSIS SYNOPSIS:: ABNORMAL
DX ICD CODE: ABNORMAL
DX ICD CODE: ABNORMAL
PATH REPORT.COMMENTS IMP SPEC: ABNORMAL
PATH REPORT.FINAL DX SPEC: ABNORMAL
PATH REPORT.GROSS SPEC: ABNORMAL
PATH REPORT.MICROSCOPIC SPEC OTHER STN: ABNORMAL
PATH REPORT.SITE OF ORIGIN SPEC: ABNORMAL
PATHOLOGIST NAME: ABNORMAL
PAYMENT PROCEDURE: ABNORMAL

## 2024-09-20 ENCOUNTER — OFFICE VISIT (OUTPATIENT)
Age: 89
End: 2024-09-20
Payer: MEDICARE

## 2024-09-20 VITALS
BODY MASS INDEX: 31.5 KG/M2 | OXYGEN SATURATION: 97 % | HEIGHT: 66 IN | SYSTOLIC BLOOD PRESSURE: 120 MMHG | DIASTOLIC BLOOD PRESSURE: 78 MMHG | WEIGHT: 196 LBS | HEART RATE: 78 BPM | RESPIRATION RATE: 17 BRPM

## 2024-09-20 DIAGNOSIS — C44.229 SQUAMOUS CELL CARCINOMA OF EAR, LEFT: ICD-10-CM

## 2024-09-20 DIAGNOSIS — Z85.818 HISTORY OF MALIGNANT NEOPLASM OF PAROTID GLAND: ICD-10-CM

## 2024-09-20 DIAGNOSIS — C44.212 BASAL CELL CARCINOMA (BCC) OF AURICLE OF RIGHT EAR: Primary | ICD-10-CM

## 2024-09-20 DIAGNOSIS — H90.3 SENSORINEURAL HEARING LOSS, BILATERAL: ICD-10-CM

## 2024-09-20 PROCEDURE — 99213 OFFICE O/P EST LOW 20 MIN: CPT | Performed by: OTOLARYNGOLOGY

## 2024-09-20 PROCEDURE — 1036F TOBACCO NON-USER: CPT | Performed by: OTOLARYNGOLOGY

## 2024-09-20 PROCEDURE — 1123F ACP DISCUSS/DSCN MKR DOCD: CPT | Performed by: OTOLARYNGOLOGY

## 2024-09-20 PROCEDURE — G8417 CALC BMI ABV UP PARAM F/U: HCPCS | Performed by: OTOLARYNGOLOGY

## 2024-09-20 PROCEDURE — G8427 DOCREV CUR MEDS BY ELIG CLIN: HCPCS | Performed by: OTOLARYNGOLOGY

## 2024-09-20 RX ORDER — CETIRIZINE HYDROCHLORIDE 10 MG/1
5 TABLET ORAL DAILY
Qty: 30 TABLET | Refills: 3 | Status: SHIPPED | OUTPATIENT
Start: 2024-09-20

## 2025-04-30 ENCOUNTER — OFFICE VISIT (OUTPATIENT)
Age: 89
End: 2025-04-30
Payer: MEDICARE

## 2025-04-30 VITALS
HEART RATE: 53 BPM | OXYGEN SATURATION: 98 % | SYSTOLIC BLOOD PRESSURE: 130 MMHG | DIASTOLIC BLOOD PRESSURE: 82 MMHG | BODY MASS INDEX: 31.64 KG/M2 | HEIGHT: 66 IN

## 2025-04-30 DIAGNOSIS — C44.300 UNSPECIFIED MALIGNANT NEOPLASM OF SKIN OF UNSPECIFIED PART OF FACE: ICD-10-CM

## 2025-04-30 DIAGNOSIS — H90.3 SENSORINEURAL HEARING LOSS, BILATERAL: ICD-10-CM

## 2025-04-30 DIAGNOSIS — Z85.818 HISTORY OF MALIGNANT NEOPLASM OF PAROTID GLAND: ICD-10-CM

## 2025-04-30 DIAGNOSIS — C44.229 SQUAMOUS CELL CARCINOMA OF EAR, LEFT: Primary | ICD-10-CM

## 2025-04-30 PROCEDURE — 1159F MED LIST DOCD IN RCRD: CPT | Performed by: OTOLARYNGOLOGY

## 2025-04-30 PROCEDURE — 1123F ACP DISCUSS/DSCN MKR DOCD: CPT | Performed by: OTOLARYNGOLOGY

## 2025-04-30 PROCEDURE — 99214 OFFICE O/P EST MOD 30 MIN: CPT | Performed by: OTOLARYNGOLOGY

## 2025-04-30 PROCEDURE — G8417 CALC BMI ABV UP PARAM F/U: HCPCS | Performed by: OTOLARYNGOLOGY

## 2025-04-30 PROCEDURE — G8427 DOCREV CUR MEDS BY ELIG CLIN: HCPCS | Performed by: OTOLARYNGOLOGY

## 2025-04-30 PROCEDURE — 1036F TOBACCO NON-USER: CPT | Performed by: OTOLARYNGOLOGY

## 2025-04-30 NOTE — PROGRESS NOTES
Identified pt with two pt identifiers(name and ). Reviewed record in preparation for visit and have obtained necessary documentation. All patient medications has been reviewed.  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   No       2. Have seen or consulted any other health care provider since your last visit? No        Patient is accompanied by daughter I have received verbal consent from Duc Figueroa to discuss any/all medical information while they are present in the room.

## 2025-04-30 NOTE — PROGRESS NOTES
Subjective:    Duc Figueroa   87 y.o.   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  He reports having a sore throat was worse last week thought it might have been due to mowing grass outside.  Also dry throat in the morning.  Also occasionally feeling choked up on his liquids.    Modifying Features -massage helps with the right neck pains    Associated symptoms/signs -hearing loss, right shoulder weakness    2/2/22 - 6 mos f/u overall doing well no changes in last 6 mos - he has seen Dr Dunbar for some skin lesions but he does not want to go to [place] for Mohs anymore    8/3/22 - 6 mo fu - pt had transferred care to [facility] dermatology - he has had some new lesions on the face that needed to be addressed    2/8/23  6 mos fu pt has been doing well; some nasal congestion/head cold past 3 weeks, mucus is thicker and he needs vicks to breathe; no new skin lesions he is concerned about; had last excisions by me in Oct 2022    8/9/23  6 mos fu - has been doing well.  Some sore throat this morning.  Had some yellow drainage before, now better.  Had OTC hearing aids didn't help    2/27/24  6 mos fu, pt has been having sinus congestion for 2-3 weeks not getting better; also right ear feels stopped up.  Also having a few skin lesions wants to have checked    5/28/24  3 mos fu - he did get better after prednisone, abx, and also PCP office gave him allergy meds and he still has some mucus he spits up.  He did not see the dermatologist in the interim.  Still has same skin lesions.    9/20/24  Follows up today.  1 mo out from resection of 3 facial skin cancers.  He feels like he has a new